# Patient Record
Sex: FEMALE | Race: BLACK OR AFRICAN AMERICAN
[De-identification: names, ages, dates, MRNs, and addresses within clinical notes are randomized per-mention and may not be internally consistent; named-entity substitution may affect disease eponyms.]

---

## 2017-01-05 ENCOUNTER — HOSPITAL ENCOUNTER (INPATIENT)
Dept: HOSPITAL 47 - FBPOP | Age: 27
LOS: 4 days | Discharge: HOME | End: 2017-01-09
Payer: COMMERCIAL

## 2017-01-05 VITALS — BODY MASS INDEX: 27.1 KG/M2

## 2017-01-05 DIAGNOSIS — D69.6: ICD-10-CM

## 2017-01-05 DIAGNOSIS — E88.09: ICD-10-CM

## 2017-01-05 DIAGNOSIS — Z3A.35: ICD-10-CM

## 2017-01-05 LAB
ALT SERPL-CCNC: 32 U/L (ref 9–52)
AST SERPL-CCNC: 34 U/L (ref 14–36)
BASOPHILS # BLD AUTO: 0 K/UL (ref 0–0.2)
BASOPHILS NFR BLD AUTO: 0 %
BUN SERPL-SCNC: 14 MG/DL (ref 7–17)
CH: 32.3
CHCM: 33.4
EOSINOPHIL # BLD AUTO: 0.1 K/UL (ref 0–0.7)
EOSINOPHIL NFR BLD AUTO: 2 %
ERYTHROCYTE [DISTWIDTH] IN BLOOD BY AUTOMATED COUNT: 4.02 M/UL (ref 3.8–5.4)
ERYTHROCYTE [DISTWIDTH] IN BLOOD: 12.9 % (ref 11.5–15.5)
GLUCOSE BLD-MCNC: 76 MG/DL (ref 75–99)
HCT VFR BLD AUTO: 39.1 % (ref 34–46)
HDW: 2.46
HGB BLD-MCNC: 12.6 GM/DL (ref 11.4–16)
KETONES UR QL STRIP.AUTO: (no result)
LUC NFR BLD AUTO: 2 %
LYMPHOCYTES # SPEC AUTO: 1.5 K/UL (ref 1–4.8)
LYMPHOCYTES NFR SPEC AUTO: 17 %
MCH RBC QN AUTO: 31.3 PG (ref 25–35)
MCHC RBC AUTO-ENTMCNC: 32.2 G/DL (ref 31–37)
MCV RBC AUTO: 97.2 FL (ref 80–100)
MONOCYTES # BLD AUTO: 0.5 K/UL (ref 0–1)
MONOCYTES NFR BLD AUTO: 6 %
NEUTROPHILS # BLD AUTO: 6.4 K/UL (ref 1.3–7.7)
NEUTROPHILS NFR BLD AUTO: 74 %
NON-AFRICAN AMERICAN GFR(MDRD): >60
PH UR: 6 [PH] (ref 5–8)
SP GR UR: 1.01 (ref 1–1.03)
UA BILLING (MACRO VS. MICRO): (no result)
URATE SERPL-MCNC: 4.9 MG/DL (ref 3.7–7.4)
UROBILINOGEN UR QL STRIP: <2 MG/DL (ref ?–2)
WBC # BLD AUTO: 0.14 10*3/UL
WBC # BLD AUTO: 8.7 K/UL (ref 3.8–10.6)
WBC (PEROX): 9.18

## 2017-01-05 PROCEDURE — 59025 FETAL NON-STRESS TEST: CPT

## 2017-01-05 PROCEDURE — 86901 BLOOD TYPING SEROLOGIC RH(D): CPT

## 2017-01-05 PROCEDURE — 82565 ASSAY OF CREATININE: CPT

## 2017-01-05 PROCEDURE — 84450 TRANSFERASE (AST) (SGOT): CPT

## 2017-01-05 PROCEDURE — 88307 TISSUE EXAM BY PATHOLOGIST: CPT

## 2017-01-05 PROCEDURE — 84520 ASSAY OF UREA NITROGEN: CPT

## 2017-01-05 PROCEDURE — 99215 OFFICE O/P EST HI 40 MIN: CPT

## 2017-01-05 PROCEDURE — 84460 ALANINE AMINO (ALT) (SGPT): CPT

## 2017-01-05 PROCEDURE — 86850 RBC ANTIBODY SCREEN: CPT

## 2017-01-05 PROCEDURE — 80053 COMPREHEN METABOLIC PANEL: CPT

## 2017-01-05 PROCEDURE — 84550 ASSAY OF BLOOD/URIC ACID: CPT

## 2017-01-05 PROCEDURE — 81003 URINALYSIS AUTO W/O SCOPE: CPT

## 2017-01-05 PROCEDURE — 83036 HEMOGLOBIN GLYCOSYLATED A1C: CPT

## 2017-01-05 PROCEDURE — 85025 COMPLETE CBC W/AUTO DIFF WBC: CPT

## 2017-01-05 PROCEDURE — 86900 BLOOD TYPING SEROLOGIC ABO: CPT

## 2017-01-05 PROCEDURE — 83735 ASSAY OF MAGNESIUM: CPT

## 2017-01-05 RX ADMIN — MAGNESIUM SULFATE IN WATER SCH MLS/HR: 40 INJECTION, SOLUTION INTRAVENOUS at 19:30

## 2017-01-05 RX ADMIN — DOCUSATE SODIUM AND SENNOSIDES SCH: 50; 8.6 TABLET ORAL at 21:20

## 2017-01-05 RX ADMIN — POTASSIUM CHLORIDE SCH MLS/HR: 14.9 INJECTION, SOLUTION INTRAVENOUS at 17:55

## 2017-01-05 NOTE — P.OP
Date of Procedure: 17


Preoperative Diagnosis: 


Intrauterine pregnancy 35 weeks: Severe gestational hypertension versus severe 

preeclamptic


Postoperative Diagnosis: 


Same


Procedure(s) Performed: 


Primary low transverse  section from Pfannenstiel


Anesthesia: spinal


Surgeon: Denis Gill


Assistant #1: Marko Tejada


Estimated Blood Loss (ml): 400


IV fluids (ml): 1,300


Urine output (ml): 200


Pathology: other (Placenta)


Condition: stable


Disposition: floor


Operative Findings: 


Female  Apgar scores of 8 and 9 at one and 5 minutes [place] weight was 

5 lbs. 12 oz.


Description of Procedure: 


Patient was taken to the operating suite where a spinal anesthetic was found be 

adequate.  She was prepped and draped in the normal sterile fashion and placed 

in dorsal supine position with leftward tilt.  Initially a Pfannenstiel skin 

incision was made and this incision was then carried through to underlying 

layer of the fascia was second knife.  Fascia was then nicked in the midline 

and this opening was extended laterally with Prabhakar scissors.  Superior and 

inferior aspect of this incision were then grasped tented up and bluntly and 

sharply dissected off the rectus muscles.  Rectus muscles were then divided 

midline and sharp dissection to the peritoneum was made.  This opening was then 

extended superiorly and inferiorly with good visualization of both bowel 

bladder.  Bladder blade was placed and the vesicouterine peritoneum was 

identified.  This tissue was sent entered sharply with Metzenbaum scissors and 

the opening was extended across the face the uterus with Metzenbaum scissors 

with the bladder flap being digitally created.  Knife was then used to incise 

uterus this opening was then extended bluntly.  Once this was accomplished head 

was atraumatically delivered nuchal cord 1 reduced and shoulders were 

delivered with gentle traction.  Once baby was fully delivered mouth nares were 

again bulb suctioned umbilical cord was then clamped cut usual fashion an 

nursery personnel was present to assume care.  Placenta was then delivered 

intact and Pitocin was added to the IV.  Uterus was then exteriorized cleared 

of clots and debris and closed in 2 layers with 0 Vicryl suture.  Once 

excellent hemostasis was obtained 3-0 Vicryl was used to reapproximate the 

bladder flap.  Blood and debris was then suctioned posterior cul-de-sac and the 

uterus was reinserted into the abdomen.  Peritoneal layer was then closed [place] 

suture fascial layer was closed with 0 Vicryl suture one layer of 3-0 Vicryl 

was used to reapproximate the skin and skin was then closed with 3-0 Vicryl on 

a Rj needle.  Sponge lap needle counts all were correct 2.  Patient was 

taken to the recovery room in stable and satisfactory condition.

## 2017-01-05 NOTE — P.HPOB
History of Present Illness


H&P Date: 17


Chief Complaint: Intrauterine pregnancy at 35 weeks: Severe gestational 

hypertension





Jignesh is a 26-year-old  at 35 weeks gestation who was seen in the 

office today for her normal OB visit.  At that time her blood pressure is 158/

108 deep tendon reflexes were essentially normal but she did have a mild 

headache.  Headache and present throughout the day.  She was sent over to labor 

and delivery for gestational hypertensive labs and monitoring.  Once here blood 

pressures began to even clamp higher despite being in a recovery position.  

Blood pressures were anywhere from 193/100-184/94.  This is significantly 

elevated and while she was dilated to 3 cm after discussion with both she and 

her  the decision to move forward with a  section was made out 

of concern for the fact that she was remote from delivery and even after 

receiving a dose of IV labetalol for blood pressure only went down to 170/90.  

Risks/benefits/alternatives to a primary  were made with the patient 

and all questions are answered for her prior to proceeding to the operating 

room.


Should be noted that her pregnancy course was complicated by gestational 

diabetes A2.  She was controlled with insulin that she received as a nightly 

dose but otherwise she had no significant problems or complaints.  Pertinent 

labs do include B+ blood type Rh to be negative rubella was immune hepatitis B 

surface antigen was negative.





Past Medical History


Past Medical History: No Reported History


History of Any Multi-Drug Resistant Organisms: None Reported


Past Surgical History: No Surgical Hx Reported


Past Psychological History: No Psychological Hx Reported


Smoking Status: Never smoker


Past Alcohol Use History: None Reported


Past Drug Use History: None Reported





Medications and Allergies


 Home Medications











 Medication  Instructions  Recorded  Confirmed  Type


 


Insulin NPH Human Isophane 16 unit SQ HS 17 History





[NovoLIN N]    


 


Pnv with Ca,No.72/Iron/FA 1 each PO 17  History





[Prenatal Plus Tablet]    











 Allergies











Allergy/AdvReac Type Severity Reaction Status Date / Time


 


No Known Allergies Allergy   Verified 17 17:04














Exam


Osteopathic Statement: *.  No significant issues noted on an osteopathic 

structural exam other than those noted in the History and Physical/Consult.





- Vital Signs


Vital signs: 


 Intake and Output











 17





 06:59 14:59 22:59


 


Other:   


 


  Weight   73.936 kg








 Patient Weight











 17





 06:59


 


Weight 73.936 kg














- OBG Physical Exam


Breast: both: normal (no masses)


Abdomen: bowel sounds normal, no diffuse tenderness, no bruit present, no 

guarding noted, no hepatomegaly, no splenomegaly, no mass


Vulva: both: normal


Vagina: normal moisture, no discharge


Cervix: no lesion, no discharge


Uterus: normal size, normal contour


Adnexa: both: normal


Anus/Rectum: normal perianal skin, no rectal mass, no hemorrhoids, heme negative





Results


Result Diagrams: 


 17 17:30





 17 17:30


 Abnormal Lab Results - Last 24 Hours (Table)











  17 Range/Units





  18:00 


 


Urine Ketones  1+ H  (Negative)

## 2017-01-06 LAB
BASOPHILS # BLD AUTO: 0 K/UL (ref 0–0.2)
BASOPHILS NFR BLD AUTO: 0 %
CH: 32.2
CHCM: 33.3
EOSINOPHIL # BLD AUTO: 0.1 K/UL (ref 0–0.7)
EOSINOPHIL NFR BLD AUTO: 1 %
ERYTHROCYTE [DISTWIDTH] IN BLOOD BY AUTOMATED COUNT: 3.68 M/UL (ref 3.8–5.4)
ERYTHROCYTE [DISTWIDTH] IN BLOOD: 13 % (ref 11.5–15.5)
HCT VFR BLD AUTO: 35.9 % (ref 34–46)
HDW: 2.42
HGB BLD-MCNC: 11.5 GM/DL (ref 11.4–16)
LUC NFR BLD AUTO: 1 %
LYMPHOCYTES # SPEC AUTO: 0.8 K/UL (ref 1–4.8)
LYMPHOCYTES NFR SPEC AUTO: 8 %
MCH RBC QN AUTO: 31.3 PG (ref 25–35)
MCHC RBC AUTO-ENTMCNC: 32.1 G/DL (ref 31–37)
MCV RBC AUTO: 97.3 FL (ref 80–100)
MONOCYTES # BLD AUTO: 0.4 K/UL (ref 0–1)
MONOCYTES NFR BLD AUTO: 4 %
NEUTROPHILS # BLD AUTO: 8.4 K/UL (ref 1.3–7.7)
NEUTROPHILS NFR BLD AUTO: 86 %
WBC # BLD AUTO: 0.07 10*3/UL
WBC # BLD AUTO: 9.8 K/UL (ref 3.8–10.6)
WBC (PEROX): 10.14

## 2017-01-06 RX ADMIN — POTASSIUM CHLORIDE SCH: 14.9 INJECTION, SOLUTION INTRAVENOUS at 03:35

## 2017-01-06 RX ADMIN — MAGNESIUM SULFATE IN WATER SCH MLS/HR: 40 INJECTION, SOLUTION INTRAVENOUS at 05:39

## 2017-01-06 RX ADMIN — KETOROLAC TROMETHAMINE PRN MG: 30 INJECTION, SOLUTION INTRAMUSCULAR at 00:54

## 2017-01-06 RX ADMIN — DOCUSATE SODIUM AND SENNOSIDES SCH: 50; 8.6 TABLET ORAL at 09:02

## 2017-01-06 RX ADMIN — OXYTOCIN-SODIUM CHLORIDE 0.9% IV SOLN 30 UNIT/500ML SCH: 30-0.9/5 SOLUTION at 04:17

## 2017-01-06 RX ADMIN — OXYTOCIN-SODIUM CHLORIDE 0.9% IV SOLN 30 UNIT/500ML SCH: 30-0.9/5 SOLUTION at 13:38

## 2017-01-06 RX ADMIN — KETOROLAC TROMETHAMINE PRN MG: 30 INJECTION, SOLUTION INTRAMUSCULAR at 12:54

## 2017-01-06 RX ADMIN — LABETALOL HCL SCH MG: 100 TABLET, FILM COATED ORAL at 01:07

## 2017-01-06 RX ADMIN — LABETALOL HCL SCH MG: 100 TABLET, FILM COATED ORAL at 20:25

## 2017-01-06 RX ADMIN — POTASSIUM CHLORIDE SCH: 14.9 INJECTION, SOLUTION INTRAVENOUS at 13:38

## 2017-01-06 RX ADMIN — DOCUSATE SODIUM AND SENNOSIDES SCH EACH: 50; 8.6 TABLET ORAL at 20:24

## 2017-01-06 RX ADMIN — LABETALOL HCL SCH MG: 100 TABLET, FILM COATED ORAL at 08:37

## 2017-01-06 RX ADMIN — POTASSIUM CHLORIDE SCH MLS/HR: 14.9 INJECTION, SOLUTION INTRAVENOUS at 01:04

## 2017-01-06 RX ADMIN — OXYTOCIN-SODIUM CHLORIDE 0.9% IV SOLN 30 UNIT/500ML SCH: 30-0.9/5 SOLUTION at 09:02

## 2017-01-06 RX ADMIN — KETOROLAC TROMETHAMINE PRN MG: 30 INJECTION, SOLUTION INTRAMUSCULAR at 07:46

## 2017-01-06 RX ADMIN — OXYTOCIN-SODIUM CHLORIDE 0.9% IV SOLN 30 UNIT/500ML SCH: 30-0.9/5 SOLUTION at 06:06

## 2017-01-06 RX ADMIN — KETOROLAC TROMETHAMINE PRN MG: 30 INJECTION, SOLUTION INTRAMUSCULAR at 20:33

## 2017-01-06 RX ADMIN — POTASSIUM CHLORIDE SCH: 14.9 INJECTION, SOLUTION INTRAVENOUS at 06:05

## 2017-01-06 RX ADMIN — MAGNESIUM SULFATE IN WATER SCH MLS/HR: 40 INJECTION, SOLUTION INTRAVENOUS at 15:44

## 2017-01-06 NOTE — P.PNOBGPC
Subjective





- Subjective


Principal diagnosis: Postoperative day 1: Gestational hypertension


Interval history: 





Patient is doing very well this morning.  Her blood pressure significant 

improved with labetalol they are 120s over 80s.  Voices no complaints no 

headache epigastric pain or other signs or symptoms of preeclampsia.  Vital 

signs are again stable.  Heart regular, lungs clear, extremities without pain.  

Abdomen soft incision is intact.  Assessment postop day 1.  Plan continue 

current care.





Objective





- Vital Signs


Latest vital signs: 


 Vital Signs











  Temp Pulse Resp BP Pulse Ox


 


 01/06/17 09:36   70  17  124/84 


 


 01/06/17 08:36   64   123/102 


 


 01/06/17 08:00  97.5 F L  66  17  141/81  97


 


 01/06/17 05:45  97.5 F L  62  16  126/91  98


 


 01/06/17 05:00      98


 


 01/06/17 04:00  97.6 F  68  16  122/74 


 


 01/06/17 03:00      99


 


 01/06/17 01:00  98.0 F  73  16  134/76  99


 


 01/05/17 23:00  97.6 F  76  16  129/85  99


 


 01/05/17 21:26      99


 


 01/05/17 21:23  97.5 F L  71  16  135/84  99


 


 01/05/17 20:51   62  16  147/85  100


 


 01/05/17 20:23   67  16  147/85  100


 


 01/05/17 19:53  97.0 F L  65  16  150/90  100


 


 01/05/17 19:38  97.0 F L  62  16  147/85  99


 


 01/05/17 19:23   61  16  145/84  99


 


 01/05/17 19:08   65  16  168/82  98


 


 01/05/17 18:53  96.1 F L  57 L  17  143/90  99


 


 01/05/17 17:26  96.6 F L  52 L  16  172/96 








 Intake and Output











 01/05/17 01/06/17 01/06/17





 22:59 06:59 14:59


 


Intake Total 50 1950 


 


Output Total 800 1200 


 


Balance -750 750 


 


Intake:   


 


  IV  500 


 


    Magnesium Sulfate-Water  500 





    Pmx 20 gm In Water For   





    Injection 1 500ml.bag @ 2   





    GM/HR 50 mls/hr IV .Q10H   





    JOSE A Rx#:[id number]   


 


  Intake, IV Titration 50 1450 





  Amount   


 


    Lactated Ringers 1,000 ml  950 





    @ 20 mls/hr IV .Q24H JOSE A   





    Rx#:[id number]   


 


    Magnesium Sulfate-Water  500 





    Pmx 20 gm In Water For   





    Injection 1 500ml.bag @ 2   





    GM/HR 50 mls/hr IV .Q10H   





    JOSE A Rx#:[id number]   


 


    Magnesium Sulfate-Water 50  





    Pmx 4 gm In Water For   





    Injection 50 50ml.bag @   





    150 mls/hr IVPB ONCE ONE   





    Rx#:[id number]   


 


Output:   


 


  Urine 800 1200 


 


Other:   


 


  Voiding Method Indwelling Catheter  Indwelling Catheter


 


  # Voids   600


 


  Weight 73.936 kg  














- Exam


Lungs: bilateral: normal


Chest: Normal S1, Normal S2


Extremities: Present: normal


Abdomen: Present: normal appearance, soft.  Absent: distention, tenderness


Incision: Present: normal, dry, intact


Uterus: Present: normal, firm





- Labs


Labs: 


 Abnormal Lab Results - Last 24 Hours (Table)











  01/05/17 01/06/17 Range/Units





  18:00 07:45 


 


RBC   3.68 L  (3.80-5.40)  m/uL


 


Plt Count   136 L  (150-450)  k/uL


 


Neutrophils #   8.4 H  (1.3-7.7)  k/uL


 


Lymphocytes #   0.8 L  (1.0-4.8)  k/uL


 


Urine Ketones  1+ H   (Negative)

## 2017-01-06 NOTE — P.PN
Progress Note - Text





Date: Time:710





Patient is status post . Patient seen this morning with VAS score of . 

c/o of pruritus, no c/o nausea/vomiting, comfortable and doing well.

## 2017-01-07 LAB
ALP SERPL-CCNC: 107 U/L (ref 38–126)
ALT SERPL-CCNC: 27 U/L (ref 9–52)
ANION GAP SERPL CALC-SCNC: 6 MMOL/L
AST SERPL-CCNC: 29 U/L (ref 14–36)
BUN SERPL-SCNC: 7 MG/DL (ref 7–17)
CALCIUM SPEC-MCNC: 8.7 MG/DL (ref 8.4–10.2)
CHLORIDE SERPL-SCNC: 109 MMOL/L (ref 98–107)
CO2 SERPL-SCNC: 24 MMOL/L (ref 22–30)
GLUCOSE BLD-MCNC: 161 MG/DL (ref 75–99)
GLUCOSE SERPL-MCNC: 138 MG/DL (ref 74–99)
NON-AFRICAN AMERICAN GFR(MDRD): >60
POTASSIUM SERPL-SCNC: 4.7 MMOL/L (ref 3.5–5.1)
PROT SERPL-MCNC: 5.3 G/DL (ref 6.3–8.2)
SODIUM SERPL-SCNC: 139 MMOL/L (ref 137–145)

## 2017-01-07 RX ADMIN — IBUPROFEN PRN MG: 600 TABLET ORAL at 03:49

## 2017-01-07 RX ADMIN — POTASSIUM CHLORIDE SCH: 14.9 INJECTION, SOLUTION INTRAVENOUS at 07:31

## 2017-01-07 RX ADMIN — POTASSIUM CHLORIDE SCH: 14.9 INJECTION, SOLUTION INTRAVENOUS at 12:12

## 2017-01-07 RX ADMIN — DOCUSATE SODIUM AND SENNOSIDES SCH: 50; 8.6 TABLET ORAL at 08:56

## 2017-01-07 RX ADMIN — LABETALOL HCL SCH MG: 100 TABLET, FILM COATED ORAL at 08:26

## 2017-01-07 RX ADMIN — DOCUSATE SODIUM AND SENNOSIDES SCH EACH: 50; 8.6 TABLET ORAL at 20:13

## 2017-01-07 RX ADMIN — MAGNESIUM SULFATE IN WATER SCH: 40 INJECTION, SOLUTION INTRAVENOUS at 09:45

## 2017-01-07 RX ADMIN — LABETALOL HCL SCH MG: 200 TABLET, FILM COATED ORAL at 16:49

## 2017-01-07 RX ADMIN — POTASSIUM CHLORIDE SCH: 14.9 INJECTION, SOLUTION INTRAVENOUS at 07:30

## 2017-01-07 RX ADMIN — ACETAMINOPHEN AND CODEINE PHOSPHATE PRN EACH: 300; 30 TABLET ORAL at 16:09

## 2017-01-07 RX ADMIN — ACETAMINOPHEN AND CODEINE PHOSPHATE PRN EACH: 300; 30 TABLET ORAL at 20:12

## 2017-01-07 RX ADMIN — IBUPROFEN PRN MG: 600 TABLET ORAL at 21:18

## 2017-01-07 RX ADMIN — IBUPROFEN PRN MG: 600 TABLET ORAL at 11:51

## 2017-01-07 RX ADMIN — ACETAMINOPHEN AND CODEINE PHOSPHATE PRN EACH: 300; 30 TABLET ORAL at 00:17

## 2017-01-07 RX ADMIN — MAGNESIUM SULFATE IN WATER SCH: 40 INJECTION, SOLUTION INTRAVENOUS at 07:32

## 2017-01-07 RX ADMIN — ACETAMINOPHEN AND CODEINE PHOSPHATE PRN EACH: 300; 30 TABLET ORAL at 07:37

## 2017-01-07 NOTE — P.PNOBGPC
Subjective





- Subjective


Principal diagnosis: Postoperative day 2


Interval history: 





Jignesh is overall doing well.  Her blood pressures are noted to still be in 

the 140s 150s over 90s.  However this is stable and there've been no 

significant elevations.  We'll continue to monitor her through today continue 

with labetalol.  Should her blood pressures change may need to increase the 

dose of labetalol but at this time with only mild blood pressure elevations 

will plan to maintain dosage.  Otherwise she is ambulating, voiding and she is 

tolerating her diet.  She voices no other complaints.





Objective





- Vital Signs


Latest vital signs: 


 Vital Signs











  Temp Pulse Resp BP Pulse Ox


 


 01/07/17 03:48  98.6 F  74  10 L  145/93 


 


 01/07/17 00:00  98.0 F  76  10 L  146/89 


 


 01/06/17 16:00  97.6 F  74  19  134/91  98


 


 01/06/17 12:00  97.6 F  71  17  121/84  100


 


 01/06/17 09:36   70  17  124/84 


 


 01/06/17 08:36   64   123/102 


 


 01/06/17 08:00  97.5 F L  66  17  141/81  97








 Intake and Output











 01/06/17 01/07/17 01/07/17





 22:59 06:59 14:59


 


Intake Total 500  


 


Output Total 800 401 


 


Balance -300 -401 


 


Intake:   


 


  Intake, IV Titration 500  





  Amount   


 


    Magnesium Sulfate-Water 500  





    Pmx 20 gm In Water For   





    Injection 1 500ml.bag @ 2   





    GM/HR 50 mls/hr IV .Q10H   





    JOSE A Rx#:[id number]   


 


Output:   


 


  Urine 800 401 


 


Other:   


 


  Voiding Method Indwelling Catheter  


 


  # Voids  1 














- Exam


Lungs: bilateral: normal


Chest: Normal S1, Normal S2


Extremities: Present: normal


Abdomen: Present: normal appearance, soft, other (Incisional tenderness only, 

incision is otherwise clean dry and intact).  Absent: distention, tenderness


Incision: Present: normal, dry, intact


Uterus: Present: normal, firm





- Labs


Labs: 


 Abnormal Lab Results - Last 24 Hours (Table)











  01/06/17 Range/Units





  07:45 


 


RBC  3.68 L  (3.80-5.40)  m/uL


 


Plt Count  136 L  (150-450)  k/uL


 


Neutrophils #  8.4 H  (1.3-7.7)  k/uL


 


Lymphocytes #  0.8 L  (1.0-4.8)  k/uL

## 2017-01-08 LAB
GLUCOSE BLD-MCNC: 91 MG/DL (ref 75–99)
HEMOGLOBIN A1C: 5.2 % (ref 4.2–6.1)

## 2017-01-08 RX ADMIN — IBUPROFEN PRN MG: 600 TABLET ORAL at 16:17

## 2017-01-08 RX ADMIN — DOCUSATE SODIUM AND SENNOSIDES SCH: 50; 8.6 TABLET ORAL at 21:29

## 2017-01-08 RX ADMIN — DOCUSATE SODIUM AND SENNOSIDES SCH EACH: 50; 8.6 TABLET ORAL at 07:26

## 2017-01-08 RX ADMIN — LABETALOL HCL SCH MG: 200 TABLET, FILM COATED ORAL at 08:14

## 2017-01-08 RX ADMIN — LABETALOL HCL SCH MG: 200 TABLET, FILM COATED ORAL at 20:53

## 2017-01-08 RX ADMIN — ACETAMINOPHEN AND CODEINE PHOSPHATE PRN EACH: 300; 30 TABLET ORAL at 04:51

## 2017-01-08 RX ADMIN — ACETAMINOPHEN AND CODEINE PHOSPHATE PRN EACH: 300; 30 TABLET ORAL at 20:53

## 2017-01-08 RX ADMIN — IBUPROFEN PRN MG: 600 TABLET ORAL at 07:27

## 2017-01-08 RX ADMIN — ACETAMINOPHEN AND CODEINE PHOSPHATE PRN EACH: 300; 30 TABLET ORAL at 13:29

## 2017-01-08 NOTE — P.PNOBGPC
Subjective





- Subjective


Principal diagnosis: Postoperative day 3


Interval history: 





Patient is doing overall well.  Her blood pressure seems to be better following 

adjustments of her blood pressure medicines.  Her IV did come out we'll 

therefore continue to leave it out unless she starts having very significant 

blood pressure issues.  Also was noted that medicine had at re-added Accu-Cheks 

with coverage.  However, from a gestational diabetic standpoint there is no 

reason to continue those once she has delivered so we will therefore 

discontinued the Accu-Cheks.  Otherwise she is able to ambulate, void and she 

is tolerating her diet.  Her baby is doing very well in special care nursery.  

Is just now matter of getting her stabilized so that we can discuss potentially 

discharged home at some point in the near future.  Her vital signs currently as 

stated are stable and she is afebrile.  Her only complaint is that she has a 

mild rash along her upper abdomen and back through her buttock area likely this 

is due to either the soap that was used preoperatively or the drape as it is 

falling those lines.  Otherwise pain is well-controlled and she voices no other 

complaints this time.





Objective





- Vital Signs


Latest vital signs: 


 Vital Signs











  Temp Pulse Resp BP Pulse Ox


 


 01/08/17 10:04   79   128/75 


 


 01/08/17 09:18     136/89 


 


 01/08/17 08:13     140/90 


 


 01/08/17 08:00  97.3 F L  75  17  150/85  100


 


 01/07/17 22:45  98.1 F  74  18  130/73  98


 


 01/07/17 22:10   57 L  18  150/88 


 


 01/07/17 19:20   59 L  18  155/97 


 


 01/07/17 17:50     150/96 


 


 01/07/17 16:45   62   155/93 


 


 01/07/17 16:15     166/107 


 


 01/07/17 16:00  98.3 F  62  17  161/88  100








 Intake and Output











 01/07/17 01/08/17 01/08/17





 22:59 06:59 14:59


 


Intake Total  600 


 


Balance  600 


 


Intake:   


 


  Oral  600 


 


Other:   


 


  # Voids 2 2 1














- Exam


Lungs: bilateral: normal


Chest: Normal S1, Normal S2


Extremities: Present: normal


Abdomen: Present: normal appearance, soft.  Absent: distention, tenderness


Incision: Present: normal, dry, intact


Uterus: Present: normal, firm





- Labs


Labs: 


 Abnormal Lab Results - Last 24 Hours (Table)











  01/07/17 01/07/17 Range/Units





  11:54 22:05 


 


Chloride   109 H  ()  mmol/L


 


Glucose   138 H  (74-99)  mg/dL


 


POC Glucose (mg/dL)  161 H   (75-99)  mg/dL


 


Total Protein   5.3 L  (6.3-8.2)  g/dL


 


Albumin   2.7 L  (3.5-5.0)  g/dL

## 2017-01-08 NOTE — CONS
DATE OF CONSULTATION:  



REASON FOR CONSULTATION:  Hypertension and multiple medical issues 

requested by Dr. Gill.  





HISTORY OF PRESENT ILLNESS: This 26 -year-old woman presented with 

severe gestational hypertension underwent a  section. The 

patient is having persistent hypertension.  The blood pressure is 

found to be 161/88, 167/107, 155/93, 150/96, labetalol dose was 

increased.  There is no history of fever, rigors or chills. No history 

of headache, loss of consciousness.  No chest pain, palpitations, 

shortness of breath, hematochezia, melena. The patient being followed 

by Dr. Nuñez in the outpatient setting.  Magnesium was administered 

after admission.  



Past medical history of ganglion cyst, otherwise, no history of 

previous hypertension or diabetes mellitus.  



SOCIAL HISTORY: No history of smoking. No history of alcohol intake. 



REVIEW OF SYSTEMS:

ENT: No diminished hearing or diminished vision.

CARDIOVASCULAR: No angina. 

RESPIRATORY: As mentioned earlier.

GASTROINTESTINAL: As mentioned earlier.

: As mentioned earlier.

CENTRAL NERVOUS SYSTEM: No numbness or weakness. 

Allergy/immunology: No asthma or hayfever. 

MUSCULOSKELETAL: As mentioned earlier. 

HEMATOLOGY/ONCOLOGY: No history of anemia. 

ENDOCRINE: Gestational diabetes. 

CONSTITUTIONAL: As mentioned earlier.  

DERMATOLOGY: Negative. 

RHEUMATOLOGY: Negative.

PSYCHIATRY: Negative.



PHYSICAL EXAMINATION:  Alert and oriented times three. Pulse 59,  

blood pressure 155/97. Respiratory  rate 18.  Temperature is normal.  

Pulse ox 100% on room air.  HEENT: Conjunctivae normal. Oral mucosa 

moist.  

NECK: No jugular venous distention. No thyroid enlargement. No carotid 

bruit.   

CARDIOVASCULAR: S1, S2 muffled.  No S3, no S4.  No murmur. No thrills.  

RESPIRATORY: Breath sounds diminished at the bases.  No rhonchi. No 

crackles.  

ABDOMEN: Soft, status post guarding, rigidity. No mass palpable. Legs: 

Bilaterally, minimal leg edema. Nervous system: Higher functions as 

mentioned earlier.  No focal deficits.  

LYMPHATICS: No lymph nodes palpable in the neck, axillae or groin.  

SKIN: No ulcer, rash or bleeding.



LABS: WBC 9.8 and platelets 136.  



ASSESSMENT: 

1. Pregnancy associated hypertension. 

2. Status post  section. 

3. Thrombocytopenia mild. 

4. Increased random blood sugar. 

5. History of ganglion cyst 



RECOMMENDATIONS AND DISCUSSION: This 26 -year-old woman who presented 

with multiple medical problems, agree with increase in Labetalol dose, 

add amlodipine.  Also recommend hydralazine p.r.n.    Baseline labs 

including liver functions may be obtained.  We will follow the patient 

closely.  The patient may be asked to follow with Dr. Nuñez closely 

after discharge.  



Thank you Dr. Gill for letting us participate in the care of this 

patient.   



MTDD

## 2017-01-09 VITALS — DIASTOLIC BLOOD PRESSURE: 74 MMHG | HEART RATE: 70 BPM | SYSTOLIC BLOOD PRESSURE: 118 MMHG

## 2017-01-09 VITALS — TEMPERATURE: 98.4 F | RESPIRATION RATE: 20 BRPM

## 2017-01-09 RX ADMIN — DOCUSATE SODIUM AND SENNOSIDES SCH: 50; 8.6 TABLET ORAL at 09:01

## 2017-01-09 RX ADMIN — IBUPROFEN PRN MG: 600 TABLET ORAL at 00:06

## 2017-01-09 RX ADMIN — LABETALOL HCL SCH MG: 200 TABLET, FILM COATED ORAL at 09:01

## 2017-01-09 NOTE — PN
DATE OF SERVICE:  01/08/2017 



This 26-year-old woman was admitted after pregnancy-induced 

hypertension. She is being closely monitored. Blood pressure are 

fluctuating at this time.  No chest pain or palpitation.  No fever.  

Patient's labetalol at 200 mg p.o. b.i.d. and patient also received 

Norvasc yesterday. 



On exam, alert and oriented x3. Pulse 63, blood pressure is 155/95, 

respirations 20, temperature is normal, pulse ox 100% on room air. 

HEENT:  Conjunctivae normal.  

NECK:  No jugular venous distension.

CARDIOVASCULAR SYSTEM:  S1, S2, muffled.

RESPIRATORY:  Breath sounds diminished at the bases, no rhonchi, no 

crackles.  

Abdomen is soft, nontender. 

NERVOUS SYSTEM:  No focal deficits.



LABS:  Albumin 2.7, glucose 138 and 91. 



ASSESSMENT: 

1. Pregnancy-associated hypertension. 

2. Status post Cesarian section. 

3. Thrombocytopenia, mild. 

4. Increased random blood sugar and gestational diabetes. 

5. History of ganglion cyst removal. 

6. Mild hypoalbuminemia.



RECOMMENDATION:  In this 26-year-old woman who presented after 

delivery as high blood pressure. I would recommend to continue with 

Norvasc 5 mg p.o. daily and continue to monitor. Otherwise, labetalol 

200 mg p.o. b.i.d. and the medication we will try to titrate once the 

blood pressure is controlled. Further recommendations to follow.

## 2017-01-09 NOTE — P.PN
Progress Note - Text





Patient seen and evaluated postop day 4.  She is able to ambulate, she is 

voiding, and she is tolerating her diet.  Her vital signs overall have been 

better but still her blood pressures of been elevated despite being on 2 

antihypertensive.  The majority of her blood pressures of been in the 140s to 

150s over 90s.  If she was not on blood pressure medicine I don't noted be 

terribly concerned the fact that she is on 2 blood pressure medicines and still 

has high blood pressure is somewhat concerning to me at this time.  We'll plan 

to allow medicine to re-see her for potential adjustments to her medication.  

Ordinarily on postop day 4 we are trying get them home however at this time I 

do not feel she is stable as she really has not tried to ambulate much in the 

last couple of days since her blood pressure started going back up and 

therefore I'm not sure how movement and daily activities will affect her blood 

pressure.  Plan for today will be to have her get up and move around [facility] the halls go to special care nursery to see her baby and check her 

blood pressures intermittently to verify her blood pressures are holding stable 

on the medications.


Physical exam her heart is regular, lungs are clear, extremities without pain.  

Abdomen soft uterus firm incision is clean dry and intact.


Assessment postop day 4 with gestational hypertension.  Plan continue current 

care for right now.

## 2017-01-09 NOTE — PN
DATE OF SERVICE: 2017



This 26-year-old woman who was admitted after pregnancy-induced 

hypertension is being closely monitored. The blood pressure is 

improving. No chest pain. No palpitation. No fever. On exam, alert and 

oriented x3. Pulse is 88, blood pressure 142/84, respiration 20, 

temperature 98.4, pulse ox 99% on room air.  

HEENT: Conjunctivae normal. 

NECK: No jugular venous distention. 

CARDIOVASCULAR SYSTEM: S1, S2 muffled. 

RESPIRATORY SYSTEM: Breath sounds diminished at the bases. No rhonchi. 

No crackles.    

ABDOMEN: Soft, non-tender. No mass palpable.

LEGS: No edema. No swelling. 

NERVOUS SYSTEM: No focal deficit. 



LABS: Reviewed. 



ASSESSMENT: 

1. Pregnancy-induced hypertension. 

2. Status post  section. 

3. Thrombocytopenia, mild. 

4. Increased random blood sugar and gestational diabetes mellitus. 

5. History of ganglion cyst removal. 

6. Mild hypoalbuminemia. 



RECOMMENDATIONS AND DISCUSSION: At this time I recommend continuing 

the current medications, continuing with symptomatic treatment, 

continuing the labetalol and Norvasc. Continue to follow up with the 

primary physician in the outpatient setting. Otherwise, patient may be 

discharged home. Recommendations and prescriptions are written. 

Further recommendations to follow.

## 2017-05-24 NOTE — P.DS
Providers


Date of admission: 


17 17:22





Expected date of discharge: 17


Attending physician: 


Denis Gill





Consults: 





 





17 18:09


Consult Physician Stat 


   Consulting Provider: Janey Nance Reason/Comments: Continued elevated blood pressures


   Do you want consulting provider notified?: Yes











Primary care physician: 


Stated None





Hospital Course: 





Madelyn was discharged on postop day 4 following a  section for 

preeclampsia versus pregnancy-induced hypertension.  She also had gestational 

diabetes A2.  Due to elevated blood pressures Dr. Nance was asked to consult 

and manage her blood pressures post delivery.  Medications were initiated and 

blood pressures were stabilized prior to discharge.  She was discharged home on 

Norvasc as well as pain medication and she was to follow-up in the next 2-3 

days for blood pressure check.  Her incisions the time of discharge is clean 

dry and intact.  Please see Dr. Nance's notes regarding decision making process 

prior to discharge.  Her heart was regular, lungs were clear, extremities 

without pain.  Overall the patient did well postoperatively and was discharged 

home in stable condition on post op day 4.  Assessment postop day 4 with 

pregnancies hypertension, gestational diabetes A2


Plan follow up with me in a few days for blood pressure check and reevaluation 

of incision.


Patient Condition at Discharge: Good





Plan - Discharge Summary


New Discharge Prescriptions: 


Acetaminophen-Codeine 300-30mg [Tylenol #3] 1 tab PO Q4H PRN #30 tablet


 PRN Reason: Pain


amLODIPine [Norvasc] 5 mg PO DAILY #30 tab


Ibuprofen [Motrin] 600 mg PO Q6HR PRN #30 tab


 PRN Reason: Pain


Labetalol [Trandate] 200 mg PO BID #60 tablet


Discharge Medication List





Insulin NPH Human Isophane [NovoLIN N] 16 unit SQ HS 17 [History]


Pnv,Calcium 72/Iron/Folic Acid [Prenatal Plus Tablet] 1 each PO 17 [

History]


Acetaminophen-Codeine 300-30mg [Tylenol #3] 1 tab PO Q4H PRN #30 tablet  [Rx]


Ibuprofen [Motrin] 600 mg PO Q6HR PRN #30 tab 17 [Rx]


Labetalol [Trandate] 200 mg PO BID #60 tablet 17 [Rx]


amLODIPine [Norvasc] 5 mg PO DAILY #30 tab 17 [Rx]








Follow up Appointment(s)/Referral(s): 


Denis Gill DO [Doctor of Osteopathic Medicine] - 3 Days


Di Nuñez MD [REFERRING] - 1 Week (Maintain log of Blood pressures and 

take to follow-up visit with PCP for further recommendations)


Activity/Diet/Wound Care/Special Instructions: 


No heavy lifting, limit stairs and driving and pelvic rest.  If any high 

temperatures, heavy bleeding, or severe pain call my office.  Should she have 

any or feelings that she may have increasing blood pressures she is to report 

to the emergency room.


Discharge Disposition: HOME SELF-CARE

## 2021-03-11 ENCOUNTER — HOSPITAL ENCOUNTER (EMERGENCY)
Dept: HOSPITAL 47 - EC | Age: 31
Discharge: HOME | End: 2021-03-11
Payer: COMMERCIAL

## 2021-03-11 VITALS — SYSTOLIC BLOOD PRESSURE: 118 MMHG | TEMPERATURE: 97.6 F | HEART RATE: 82 BPM | DIASTOLIC BLOOD PRESSURE: 72 MMHG

## 2021-03-11 VITALS — RESPIRATION RATE: 16 BRPM

## 2021-03-11 DIAGNOSIS — K12.1: Primary | ICD-10-CM

## 2021-03-11 DIAGNOSIS — Z79.3: ICD-10-CM

## 2021-03-11 LAB
PH UR: 5.5 [PH] (ref 5–8)
RBC UR QL: 1 /HPF (ref 0–5)
SP GR UR: 1.03 (ref 1–1.03)
SQUAMOUS UR QL AUTO: 8 /HPF (ref 0–4)
UROBILINOGEN UR QL STRIP: <2 MG/DL (ref ?–2)
WBC # UR AUTO: 2 /HPF (ref 0–5)

## 2021-03-11 PROCEDURE — 99283 EMERGENCY DEPT VISIT LOW MDM: CPT

## 2021-03-11 PROCEDURE — 81001 URINALYSIS AUTO W/SCOPE: CPT

## 2021-03-11 PROCEDURE — 81025 URINE PREGNANCY TEST: CPT

## 2021-03-11 NOTE — ED
General Adult HPI





- General


Chief complaint: Allergic Reaction


Stated complaint: ENT


Source: patient


Mode of arrival: ambulatory


Limitations: no limitations





- History of Present Illness


Initial comments: 





30-year-old female without any significant past medical history presents to the 

emergency room for a chief complaint of mouth pain.  Patient reports that she 

started to have some pain with urination on Monday so took an unknown antibiotic

that she had left over that is .  Patient states shortly afterward her 

mouth started to hurt along the sides of her tongue and her lower lip.  States 

there are swollen as well.  The swelling has since improved however she still 

has pain on the sides of her tongue as well as the bottom anterior aspect of her

lower lip.  Patient states she tried Orajel over-the-counter but does not seem 

to be working.  Patient states she is here for some relief for this.  Patient 

denies fevers.  Denies pain elsewhere.  Patient still does admit to a very mild 

dysuria, denies flank pain abdominal pain fevers.Patient has no other complaints

at this time including shortness of breath, chest pain, abdominal pain, nausea 

or vomiting, headache, or visual changes.





- Related Data


                                Home Medications











 Medication  Instructions  Recorded  Confirmed


 


HYDROcodone/APAP 10-325MG [Norco 1 tab PO DAILY PRN 21





]   


 


medroxyPROGESTERone [Depo-Provera] 150 mg IM Q90D 21








                                  Previous Rx's











 Medication  Instructions  Recorded


 


Lidocaine Viscous [Xylocaine 5 ml PO QID #50 ml 21





Viscous 2%]  











                                    Allergies











Allergy/AdvReac Type Severity Reaction Status Date / Time


 


Sulfa (Sulfonamide Allergy Severe Anaphylaxis Verified 21 09:09





Antibiotics)     














Review of Systems


ROS Statement: 


Those systems with pertinent positive or pertinent negative responses have been 

documented in the HPI.





ROS Other: All systems not noted in ROS Statement are negative.





Past Medical History


Past Medical History: No Reported History


History of Any Multi-Drug Resistant Organisms: None Reported


Past Surgical History: No Surgical Hx Reported


Additional Past Surgical History / Comment(s): ganglion cyst removal-left wrist 




Past Anesthesia/Blood Transfusion Reactions: No Reported Reaction


Past Psychological History: No Psychological Hx Reported


Smoking Status: Never smoker


Past Alcohol Use History: None Reported


Past Drug Use History: None Reported





- Past Family History


  ** Mother


Family Medical History: Hypertension





General Exam


Limitations: no limitations


General appearance: alert, in no apparent distress


Head exam: Present: atraumatic, normocephalic, normal inspection


Eye exam: Present: normal appearance, PERRL, EOMI.  Absent: scleral icterus, 

conjunctival injection, periorbital swelling


ENT exam: Present: mucous membranes moist, TM's normal bilaterally.  Absent: 

normal oropharynx (Tongue appears normal however patient does have small 

ulceration noted on the middle aspect of the interior lower lip.  No sores 

elsewhere on the outside of the mouth.)


Neck exam: Present: normal inspection, full ROM.  Absent: tenderness, 

meningismus, lymphadenopathy


Respiratory exam: Present: normal lung sounds bilaterally.  Absent: respiratory 

distress, wheezes, rales, rhonchi, stridor


Cardiovascular Exam: Present: regular rate, normal rhythm, normal heart sounds. 

Absent: systolic murmur, diastolic murmur, rubs, gallop, clicks


GI/Abdominal exam: Present: soft, normal bowel sounds.  Absent: distended, 

tenderness, guarding, rebound, rigid


Back exam: Absent: CVA tenderness (R), CVA tenderness (L)


Skin exam: Present: warm, dry, intact, normal color.  Absent: rash





Course


                                   Vital Signs











  21





  08:35 09:24


 


Temperature 98.5 F 98.0 F


 


Pulse Rate 115 H 85


 


Respiratory 16 16





Rate  


 


Blood Pressure 137/93 123/96


 


O2 Sat by Pulse 98 98





Oximetry  














Medical Decision Making





- Medical Decision Making





Vitals are stable.  Patient initially tachycardic which did improve throughout 

her stay, likely related to anxiety.  Physical exam as documented.  Patient was 

given Magic mouthwash prescription.  Suspect ulceration will resolve on its own.

 No histories of fevers at home.  Urinalysis is unremarkable.  No obvious 

evidence of infection.  Patient reports that her symptoms of dysuria have almost

completely resolved.  She will follow up with her doctor.  She will return for 

any worsening symptoms.





- Lab Data


                                   Lab Results











  21 Range/Units





  09:09 09:09 


 


Urine Color  Yellow   


 


Urine Appearance  Cloudy H   (Clear)  


 


Urine pH  5.5   (5.0-8.0)  


 


Ur Specific Gravity  1.027   (1.001-1.035)  


 


Urine Protein  Negative   (Negative)  


 


Urine Glucose (UA)  Negative   (Negative)  


 


Urine Ketones  Negative   (Negative)  


 


Urine Blood  Negative   (Negative)  


 


Urine Nitrite  Negative   (Negative)  


 


Urine Bilirubin  Negative   (Negative)  


 


Urine Urobilinogen  <2.0   (<2.0)  mg/dL


 


Ur Leukocyte Esterase  Negative   (Negative)  


 


Urine RBC  1   (0-5)  /hpf


 


Urine WBC  2   (0-5)  /hpf


 


Ur Squamous Epith Cells  8 H   (0-4)  /hpf


 


Urine Mucus  Rare H   (None)  /hpf


 


Urine HCG, Qual   Not Detected  (Not Detectd)  














Disposition


Clinical Impression: 


 Ulceration of oral mucosa





Disposition: HOME SELF-CARE


Condition: Good


Instructions (If sedation given, give patient instructions):  Gingivostomatitis 

(ED)


Additional Instructions: 


Please makes medication as directed. Mix 5 mL of lidocaine with 5 mL of maalox 

and 5 mL of liquid benadryl. Swish and spit.  Do not swallow.  Follow-up with 

your doctor in one to 2 days.  Return to the emergency room for any worsening 

symptoms.


Prescriptions: 


Lidocaine Viscous [Xylocaine Viscous 2%] 5 ml PO QID #50 ml


Is patient prescribed a controlled substance at d/c from ED?: No


Referrals: 


Di Nuñez MD [Primary Care Provider] - 1-2 days


Time of Disposition: 10:15

## 2022-12-06 ENCOUNTER — HOSPITAL ENCOUNTER (EMERGENCY)
Dept: HOSPITAL 47 - EC | Age: 32
Discharge: HOME | End: 2022-12-06
Payer: COMMERCIAL

## 2022-12-06 VITALS
HEART RATE: 75 BPM | DIASTOLIC BLOOD PRESSURE: 87 MMHG | SYSTOLIC BLOOD PRESSURE: 136 MMHG | RESPIRATION RATE: 16 BRPM | TEMPERATURE: 98.7 F

## 2022-12-06 DIAGNOSIS — M94.0: Primary | ICD-10-CM

## 2022-12-06 DIAGNOSIS — Z88.2: ICD-10-CM

## 2022-12-06 DIAGNOSIS — R20.2: ICD-10-CM

## 2022-12-06 LAB
ALBUMIN SERPL-MCNC: 5 G/DL (ref 3.5–5)
ALP SERPL-CCNC: 72 U/L (ref 38–126)
ALT SERPL-CCNC: 33 U/L (ref 4–34)
ANION GAP SERPL CALC-SCNC: 9 MMOL/L
APTT BLD: 23.6 SEC (ref 22–30)
AST SERPL-CCNC: 36 U/L (ref 14–36)
BASOPHILS # BLD AUTO: 0 K/UL (ref 0–0.2)
BASOPHILS NFR BLD AUTO: 0 %
BUN SERPL-SCNC: 14 MG/DL (ref 7–17)
CALCIUM SPEC-MCNC: 9.7 MG/DL (ref 8.4–10.2)
CHLORIDE SERPL-SCNC: 106 MMOL/L (ref 98–107)
CO2 SERPL-SCNC: 25 MMOL/L (ref 22–30)
EOSINOPHIL # BLD AUTO: 0.3 K/UL (ref 0–0.7)
EOSINOPHIL NFR BLD AUTO: 5 %
ERYTHROCYTE [DISTWIDTH] IN BLOOD BY AUTOMATED COUNT: 4.31 M/UL (ref 3.8–5.4)
ERYTHROCYTE [DISTWIDTH] IN BLOOD: 12.3 % (ref 11.5–15.5)
GLUCOSE SERPL-MCNC: 111 MG/DL (ref 74–99)
HCT VFR BLD AUTO: 40.6 % (ref 34–46)
HGB BLD-MCNC: 13.7 GM/DL (ref 11.4–16)
INR PPP: 1 (ref ?–1.2)
LYMPHOCYTES # SPEC AUTO: 2.1 K/UL (ref 1–4.8)
LYMPHOCYTES NFR SPEC AUTO: 32 %
MAGNESIUM SPEC-SCNC: 2.1 MG/DL (ref 1.6–2.3)
MCH RBC QN AUTO: 31.8 PG (ref 25–35)
MCHC RBC AUTO-ENTMCNC: 33.7 G/DL (ref 31–37)
MCV RBC AUTO: 94.2 FL (ref 80–100)
MONOCYTES # BLD AUTO: 0.3 K/UL (ref 0–1)
MONOCYTES NFR BLD AUTO: 4 %
NEUTROPHILS # BLD AUTO: 3.8 K/UL (ref 1.3–7.7)
NEUTROPHILS NFR BLD AUTO: 57 %
PLATELET # BLD AUTO: 267 K/UL (ref 150–450)
POTASSIUM SERPL-SCNC: 4 MMOL/L (ref 3.5–5.1)
PROT SERPL-MCNC: 8 G/DL (ref 6.3–8.2)
PT BLD: 10.5 SEC (ref 9–12)
SODIUM SERPL-SCNC: 140 MMOL/L (ref 137–145)
WBC # BLD AUTO: 6.7 K/UL (ref 3.8–10.6)

## 2022-12-06 PROCEDURE — 85610 PROTHROMBIN TIME: CPT

## 2022-12-06 PROCEDURE — 71046 X-RAY EXAM CHEST 2 VIEWS: CPT

## 2022-12-06 PROCEDURE — 99285 EMERGENCY DEPT VISIT HI MDM: CPT

## 2022-12-06 PROCEDURE — 85730 THROMBOPLASTIN TIME PARTIAL: CPT

## 2022-12-06 PROCEDURE — 83735 ASSAY OF MAGNESIUM: CPT

## 2022-12-06 PROCEDURE — 36415 COLL VENOUS BLD VENIPUNCTURE: CPT

## 2022-12-06 PROCEDURE — 93005 ELECTROCARDIOGRAM TRACING: CPT

## 2022-12-06 PROCEDURE — 80053 COMPREHEN METABOLIC PANEL: CPT

## 2022-12-06 PROCEDURE — 84484 ASSAY OF TROPONIN QUANT: CPT

## 2022-12-06 PROCEDURE — 85025 COMPLETE CBC W/AUTO DIFF WBC: CPT

## 2022-12-06 NOTE — ED
General Adult HPI





- General


Chief complaint: Chest Pain


Stated complaint: chest pain


Time Seen by Provider: 12/06/22 11:50


Source: patient, RN notes reviewed


Mode of arrival: ambulatory


Limitations: no limitations





- History of Present Illness


Initial comments: 


Patient is a 32-year-old -American female presenting to the emergency 

room with complaints of chest pain left chest wall reproducible with deep 

palpation, unchanged with inspiration; no known alleviating alleviating factors.

She is also complaining of intermittent left arm pain that is throbbing in 

nature with occasional numbness and tingling in her left hand. She reports that 

she has some numbness and tingling in her right hand left hand at this time is 

unchanged with change in position of the left arm. She denies any left arm 

trauma, known cervicalgia, shoulder impingement or carpal tunnel syndrome. She 

denies any neck pain or right-sided symptoms. She does report a headache ye

sterday and some mild dizziness today. She denies any shortness of breath, 

abdominal pain, nausea, vomiting, fevers or chills. She reports that she did 

begin and new exercise regimen approximately 2 weeks ago which has been 

consistent however she reports that the symptoms are not similar to her previous

exercise-induced muscle pain. She is a past medical history significant for 

gestational hypertension. She has no significant family history of coronary 

artery disease less than age of 55.





- Related Data


                                Home Medications











 Medication  Instructions  Recorded  Confirmed


 


HYDROcodone/APAP 10-325MG [Norco 1 tab PO DAILY PRN 03/11/21 03/11/21





]   


 


medroxyPROGESTERone [Depo-Provera] 150 mg IM Q90D 03/11/21 03/11/21








                                  Previous Rx's











 Medication  Instructions  Recorded


 


Lidocaine Viscous [Xylocaine 5 ml PO QID #50 ml 03/11/21





Viscous 2%]  











                                    Allergies











Allergy/AdvReac Type Severity Reaction Status Date / Time


 


Sulfa (Sulfonamide Allergy Severe Anaphylaxis Verified 03/11/21 09:09





Antibiotics)     














Review of Systems


ROS Statement: 


Those systems with pertinent positive or pertinent negative responses have been 

documented in the HPI.





ROS Other: All systems not noted in ROS Statement are negative.





Past Medical History


Last Myocardial Infarction Date:: Gestational hypertension


History of Any Multi-Drug Resistant Organisms: None Reported


Past Surgical History: No Surgical Hx Reported


Additional Past Surgical History / Comment(s): ganglion cyst removal-left wrist 

2000


Past Anesthesia/Blood Transfusion Reactions: No Reported Reaction


Past Psychological History: No Psychological Hx Reported


Smoking Status: Never smoker


Past Alcohol Use History: None Reported


Past Drug Use History: None Reported





- Past Family History


  ** Mother


Family Medical History: Hypertension





General Exam


Limitations: no limitations


General appearance: alert, in no apparent distress


Head exam: Present: atraumatic, normocephalic, normal inspection


Eye exam: Present: normal appearance, PERRL, EOMI.  Absent: scleral icterus, 

conjunctival injection, periorbital swelling


ENT exam: Present: normal exam, mucous membranes moist


Neck exam: Present: normal inspection.  Absent: tenderness, meningismus, 

lymphadenopathy


Respiratory exam: Present: normal lung sounds bilaterally.  Absent: respiratory 

distress, wheezes, rales, rhonchi, stridor


Cardiovascular Exam: Present: regular rate, normal rhythm, normal heart sounds. 

Absent: systolic murmur, diastolic murmur, rubs, gallop, clicks


GI/Abdominal exam: Present: soft, normal bowel sounds.  Absent: distended, 

tenderness, guarding, rebound, rigid


Extremities exam: Present: normal inspection.  Absent: pedal edema, joint 

swelling


  ** Left


Shoulder Exam: Present: full ROM, tenderness


Upper Arm exam: Present: full ROM, tenderness


Hand Wrist exam: Present: full ROM, tenderness, swelling


Back exam: Present: normal inspection, full ROM


Neurological exam: Present: alert, oriented X3, CN II-XII intact


Psychiatric exam: Present: normal affect, normal mood


Skin exam: Present: warm, dry, intact, normal color.  Absent: rash





Course


                                   Vital Signs











  12/06/22 12/06/22 12/06/22





  11:34 13:00 14:00


 


Temperature 97.7 F  97.9 F


 


Pulse Rate 76 62 70


 


Respiratory 16 20 18





Rate   


 


Blood Pressure 168/98 141/92 132/89


 


O2 Sat by Pulse 100 100 100





Oximetry   














Medical Decision Making





- Medical Decision Making


32-year-old -American female presenting with chest pain ongoing for 

approximately 1 week with associated headache, left arm pain, left arm 

paresthesia and dizziness. No dizziness or headache at this time. Chest pain 

worse with palpation to left chest wall. Pain radiating 5 out of 10 at this 

time. Will hold nitrate. Will give aspirin 325. Will start ACS workup however 

likely musculoskeletal in nature. Will obtain EKG, chest x-ray, CBC, CMP, 

troponin, and magnesium.





EKG interpreted by me shows sinus rhythm. Chest x-ray image interpreted by me 

reveals no acute cardiopulmonary process, no infiltration or consolidation. No 

cardiomegaly. CMP and CBC unremarkable. Troponin negative. Magnesium normal. No 

indication for further laboratory studies or diagnostic imaging at this time. 

Pain reproducible and began after new exercise regimen. Likely cardiovascular in

nature however strict return parameters to the emergency room reviewed at 

length. Will discharge home in stable condition with use of anti-inflammatories 

as needed for pain. Encourage follow-up with primary care provider.





Case discussed with Dr. Fischer.





- Lab Data


Result diagrams: 


                                 12/06/22 12:40





                                 12/06/22 12:40


                                   Lab Results











  12/06/22 12/06/22 12/06/22 Range/Units





  12:40 12:40 12:40 


 


WBC  6.7    (3.8-10.6)  k/uL


 


RBC  4.31    (3.80-5.40)  m/uL


 


Hgb  13.7    (11.4-16.0)  gm/dL


 


Hct  40.6    (34.0-46.0)  %


 


MCV  94.2    (80.0-100.0)  fL


 


MCH  31.8    (25.0-35.0)  pg


 


MCHC  33.7    (31.0-37.0)  g/dL


 


RDW  12.3    (11.5-15.5)  %


 


Plt Count  267    (150-450)  k/uL


 


MPV  7.4    


 


Neutrophils %  57    %


 


Lymphocytes %  32    %


 


Monocytes %  4    %


 


Eosinophils %  5    %


 


Basophils %  0    %


 


Neutrophils #  3.8    (1.3-7.7)  k/uL


 


Lymphocytes #  2.1    (1.0-4.8)  k/uL


 


Monocytes #  0.3    (0-1.0)  k/uL


 


Eosinophils #  0.3    (0-0.7)  k/uL


 


Basophils #  0.0    (0-0.2)  k/uL


 


PT   10.5   (9.0-12.0)  sec


 


INR   1.0   (<1.2)  


 


APTT   23.6   (22.0-30.0)  sec


 


Sodium    140  (137-145)  mmol/L


 


Potassium    4.0  (3.5-5.1)  mmol/L


 


Chloride    106  ()  mmol/L


 


Carbon Dioxide    25  (22-30)  mmol/L


 


Anion Gap    9  mmol/L


 


BUN    14  (7-17)  mg/dL


 


Creatinine    0.68  (0.52-1.04)  mg/dL


 


Est GFR (CKD-EPI)AfAm    >90  (>60 ml/min/1.73 sqM)  


 


Est GFR (CKD-EPI)NonAf    >90  (>60 ml/min/1.73 sqM)  


 


Glucose    111 H  (74-99)  mg/dL


 


Calcium    9.7  (8.4-10.2)  mg/dL


 


Magnesium    2.1  (1.6-2.3)  mg/dL


 


Total Bilirubin    1.2  (0.2-1.3)  mg/dL


 


AST    36  (14-36)  U/L


 


ALT    33  (4-34)  U/L


 


Alkaline Phosphatase    72  ()  U/L


 


Troponin I     (0.000-0.034)  ng/mL


 


Total Protein    8.0  (6.3-8.2)  g/dL


 


Albumin    5.0  (3.5-5.0)  g/dL














  12/06/22 Range/Units





  12:40 


 


WBC   (3.8-10.6)  k/uL


 


RBC   (3.80-5.40)  m/uL


 


Hgb   (11.4-16.0)  gm/dL


 


Hct   (34.0-46.0)  %


 


MCV   (80.0-100.0)  fL


 


MCH   (25.0-35.0)  pg


 


MCHC   (31.0-37.0)  g/dL


 


RDW   (11.5-15.5)  %


 


Plt Count   (150-450)  k/uL


 


MPV   


 


Neutrophils %   %


 


Lymphocytes %   %


 


Monocytes %   %


 


Eosinophils %   %


 


Basophils %   %


 


Neutrophils #   (1.3-7.7)  k/uL


 


Lymphocytes #   (1.0-4.8)  k/uL


 


Monocytes #   (0-1.0)  k/uL


 


Eosinophils #   (0-0.7)  k/uL


 


Basophils #   (0-0.2)  k/uL


 


PT   (9.0-12.0)  sec


 


INR   (<1.2)  


 


APTT   (22.0-30.0)  sec


 


Sodium   (137-145)  mmol/L


 


Potassium   (3.5-5.1)  mmol/L


 


Chloride   ()  mmol/L


 


Carbon Dioxide   (22-30)  mmol/L


 


Anion Gap   mmol/L


 


BUN   (7-17)  mg/dL


 


Creatinine   (0.52-1.04)  mg/dL


 


Est GFR (CKD-EPI)AfAm   (>60 ml/min/1.73 sqM)  


 


Est GFR (CKD-EPI)NonAf   (>60 ml/min/1.73 sqM)  


 


Glucose   (74-99)  mg/dL


 


Calcium   (8.4-10.2)  mg/dL


 


Magnesium   (1.6-2.3)  mg/dL


 


Total Bilirubin   (0.2-1.3)  mg/dL


 


AST   (14-36)  U/L


 


ALT   (4-34)  U/L


 


Alkaline Phosphatase   ()  U/L


 


Troponin I  <0.012  (0.000-0.034)  ng/mL


 


Total Protein   (6.3-8.2)  g/dL


 


Albumin   (3.5-5.0)  g/dL














- EKG Data


EKG Comments: 


EKG completed 1142 tripped reformations sinus rhythm, ventricular rate 60 bpm, 

AR interval 184 ms, QRS duration 80 ms, QT//402 ms, PRT axes 53, 42, 26





- Radiology Data


Radiology results: report reviewed, image reviewed


Chest x-ray two-view and depression by radiologist no acute process.





Disposition


Clinical Impression: 


 Atypical chest pain, Left hand paresthesia, Costochondritis





Disposition: HOME SELF-CARE


Condition: Stable


Instructions (If sedation given, give patient instructions):  Costochondritis 

(ED)


Additional Instructions: 


Please utilize over-the-counter anti-inflammatory such as ibuprofen as needed 

for pain. Please follow-up with your primary care provider. Please return to the

Emergency Department if symptoms worsen or any other concerns.


Is patient prescribed a controlled substance at d/c from ED?: No


Referrals: 


Di Nuñez MD [Primary Care Provider] - 1-2 days


Time of Disposition: 13:41

## 2022-12-06 NOTE — XR
EXAMINATION TYPE: XR chest 2V

 

DATE OF EXAM: 12/6/2022

 

COMPARISON: NONE

 

TECHNIQUE: PA and lateral views submitted.

 

HISTORY: Chest pain

 

FINDINGS:

The lungs are clear and  there is no pneumothorax, pleural effusion, or focal pneumonia.  Heart size 
normal. No overt failure.

 

IMPRESSION: 

1. No acute process.